# Patient Record
Sex: MALE | Race: WHITE | Employment: FULL TIME | URBAN - METROPOLITAN AREA
[De-identification: names, ages, dates, MRNs, and addresses within clinical notes are randomized per-mention and may not be internally consistent; named-entity substitution may affect disease eponyms.]

---

## 2017-05-31 ENCOUNTER — HOSPITAL ENCOUNTER (OUTPATIENT)
Dept: LAB | Age: 32
Discharge: HOME | End: 2017-05-31
Attending: INTERNAL MEDICINE | Admitting: INTERNAL MEDICINE

## 2017-05-31 LAB
ALBUMIN SERPL-MCNC: 4.4 GM/DL (ref 3.4–5)
ALP BLD-CCNC: 45 IU/L (ref 40–129)
ALT SERPL-CCNC: 9 U/L (ref 10–40)
AST SERPL-CCNC: 13 IU/L (ref 15–37)
BILIRUB SERPL-MCNC: 0.2 MG/DL (ref 0–1)
BILIRUBIN DIRECT: 0.2 MG/DL (ref 0–0.3)
BILIRUBIN, INDIRECT: 0 MG/DL (ref 0–0.7)
HEPATITIS B SURFACE ANTIGEN: NON REACTIVE
HEPATITIS C ANTIBODY: ABNORMAL
TOTAL PROTEIN: 6.8 GM/DL (ref 6.4–8.2)

## 2017-06-02 LAB — HIV SCREEN: NON REACTIVE

## 2018-06-05 ENCOUNTER — HOSPITAL ENCOUNTER (OUTPATIENT)
Dept: LAB | Age: 33
Discharge: HOME | End: 2018-06-05
Attending: INTERNAL MEDICINE | Admitting: INTERNAL MEDICINE

## 2018-06-05 LAB
ALBUMIN SERPL-MCNC: 5.1 GM/DL (ref 3.4–5)
ALP BLD-CCNC: 63 IU/L (ref 40–129)
ALT SERPL-CCNC: 11 U/L (ref 10–40)
AST SERPL-CCNC: 17 IU/L (ref 15–37)
BILIRUB SERPL-MCNC: 0.5 MG/DL (ref 0–1)
BILIRUBIN DIRECT: 0.2 MG/DL (ref 0–0.3)
BILIRUBIN, INDIRECT: 0.3 MG/DL (ref 0–0.7)
HEPATITIS B SURFACE ANTIGEN: NON REACTIVE
HEPATITIS C ANTIBODY: ABNORMAL
TOTAL PROTEIN: 8.2 GM/DL (ref 6.4–8.2)

## 2018-06-06 LAB — HIV SCREEN: NON REACTIVE

## 2018-10-27 ENCOUNTER — HOSPITAL ENCOUNTER (OUTPATIENT)
Age: 33
Setting detail: SPECIMEN
Discharge: HOME | End: 2018-10-27

## 2018-10-27 PROCEDURE — 86803 HEPATITIS C AB TEST: CPT

## 2018-10-27 PROCEDURE — 87340 HEPATITIS B SURFACE AG IA: CPT

## 2018-10-27 PROCEDURE — 87389 HIV-1 AG W/HIV-1&-2 AB AG IA: CPT

## 2018-10-27 PROCEDURE — 80076 HEPATIC FUNCTION PANEL: CPT

## 2018-10-29 LAB — HIV SCREEN: NON REACTIVE

## 2018-10-30 LAB
ALBUMIN SERPL-MCNC: 4.4 GM/DL (ref 3.4–5)
ALP BLD-CCNC: 63 IU/L (ref 40–129)
ALT SERPL-CCNC: 11 U/L (ref 10–40)
AST SERPL-CCNC: 12 IU/L (ref 15–37)
BILIRUB SERPL-MCNC: 0.3 MG/DL (ref 0–1)
BILIRUBIN DIRECT: 0.2 MG/DL (ref 0–0.3)
BILIRUBIN, INDIRECT: 0.1 MG/DL (ref 0–0.7)
HEPATITIS B SURFACE ANTIGEN: NON REACTIVE
HEPATITIS C ANTIBODY: ABNORMAL
TOTAL PROTEIN: 6.7 GM/DL (ref 6.4–8.2)

## 2019-01-30 ENCOUNTER — HOSPITAL ENCOUNTER (OUTPATIENT)
Dept: PSYCHIATRY | Age: 34
Setting detail: THERAPIES SERIES
Discharge: HOME | End: 2019-01-30
Payer: COMMERCIAL

## 2019-01-30 PROCEDURE — 90791 PSYCH DIAGNOSTIC EVALUATION: CPT

## 2019-01-30 PROCEDURE — 80305 DRUG TEST PRSMV DIR OPT OBS: CPT

## 2019-02-06 ENCOUNTER — HOSPITAL ENCOUNTER (OUTPATIENT)
Dept: PSYCHIATRY | Age: 34
Setting detail: THERAPIES SERIES
Discharge: HOME | End: 2019-02-06
Payer: COMMERCIAL

## 2019-02-06 PROCEDURE — 90834 PSYTX W PT 45 MINUTES: CPT

## 2019-02-13 ENCOUNTER — HOSPITAL ENCOUNTER (OUTPATIENT)
Dept: PSYCHIATRY | Age: 34
Setting detail: THERAPIES SERIES
Discharge: HOME | End: 2019-02-13
Payer: COMMERCIAL

## 2019-02-13 PROCEDURE — 90834 PSYTX W PT 45 MINUTES: CPT

## 2019-02-14 ENCOUNTER — HOSPITAL ENCOUNTER (OUTPATIENT)
Dept: PSYCHIATRY | Age: 34
Setting detail: THERAPIES SERIES
Discharge: HOME | End: 2019-02-14
Payer: COMMERCIAL

## 2019-02-14 PROCEDURE — 90853 GROUP PSYCHOTHERAPY: CPT

## 2019-02-15 ENCOUNTER — APPOINTMENT (OUTPATIENT)
Dept: PSYCHIATRY | Age: 34
End: 2019-02-15
Payer: COMMERCIAL

## 2019-02-20 ENCOUNTER — HOSPITAL ENCOUNTER (OUTPATIENT)
Dept: PSYCHIATRY | Age: 34
Setting detail: THERAPIES SERIES
Discharge: HOME | End: 2019-02-20
Payer: COMMERCIAL

## 2019-02-20 PROCEDURE — 90834 PSYTX W PT 45 MINUTES: CPT

## 2019-02-20 PROCEDURE — 80305 DRUG TEST PRSMV DIR OPT OBS: CPT

## 2019-02-21 ENCOUNTER — HOSPITAL ENCOUNTER (OUTPATIENT)
Dept: PSYCHIATRY | Age: 34
Setting detail: THERAPIES SERIES
Discharge: HOME | End: 2019-02-21
Payer: COMMERCIAL

## 2019-02-21 PROCEDURE — 90853 GROUP PSYCHOTHERAPY: CPT

## 2019-02-22 ENCOUNTER — APPOINTMENT (OUTPATIENT)
Dept: PSYCHIATRY | Age: 34
End: 2019-02-22
Payer: COMMERCIAL

## 2019-02-27 ENCOUNTER — HOSPITAL ENCOUNTER (OUTPATIENT)
Dept: PSYCHIATRY | Age: 34
Setting detail: THERAPIES SERIES
Discharge: HOME | End: 2019-02-27
Payer: COMMERCIAL

## 2019-02-27 PROCEDURE — 90834 PSYTX W PT 45 MINUTES: CPT

## 2019-02-27 PROCEDURE — 80305 DRUG TEST PRSMV DIR OPT OBS: CPT

## 2019-02-28 ENCOUNTER — HOSPITAL ENCOUNTER (OUTPATIENT)
Dept: PSYCHIATRY | Age: 34
Setting detail: THERAPIES SERIES
Discharge: HOME | End: 2019-02-28
Payer: COMMERCIAL

## 2019-02-28 PROCEDURE — 90853 GROUP PSYCHOTHERAPY: CPT

## 2019-03-01 ENCOUNTER — APPOINTMENT (OUTPATIENT)
Dept: PSYCHIATRY | Age: 34
End: 2019-03-01
Payer: COMMERCIAL

## 2019-03-06 ENCOUNTER — HOSPITAL ENCOUNTER (OUTPATIENT)
Dept: PSYCHIATRY | Age: 34
Setting detail: THERAPIES SERIES
Discharge: HOME | End: 2019-03-06
Payer: COMMERCIAL

## 2019-03-06 PROCEDURE — 90834 PSYTX W PT 45 MINUTES: CPT

## 2019-03-07 ENCOUNTER — HOSPITAL ENCOUNTER (OUTPATIENT)
Dept: PSYCHIATRY | Age: 34
Setting detail: THERAPIES SERIES
End: 2019-03-07
Payer: COMMERCIAL

## 2019-03-07 PROCEDURE — 90853 GROUP PSYCHOTHERAPY: CPT

## 2019-03-13 ENCOUNTER — HOSPITAL ENCOUNTER (OUTPATIENT)
Dept: PSYCHIATRY | Age: 34
Setting detail: THERAPIES SERIES
Discharge: HOME | End: 2019-03-13
Payer: COMMERCIAL

## 2019-03-13 PROCEDURE — 90834 PSYTX W PT 45 MINUTES: CPT

## 2019-03-14 ENCOUNTER — HOSPITAL ENCOUNTER (OUTPATIENT)
Dept: PSYCHIATRY | Age: 34
Setting detail: THERAPIES SERIES
Discharge: HOME | End: 2019-03-14
Payer: COMMERCIAL

## 2019-03-14 PROCEDURE — 90853 GROUP PSYCHOTHERAPY: CPT

## 2019-03-20 ENCOUNTER — APPOINTMENT (OUTPATIENT)
Dept: PSYCHIATRY | Age: 34
End: 2019-03-20
Payer: COMMERCIAL

## 2019-03-21 ENCOUNTER — APPOINTMENT (OUTPATIENT)
Dept: PSYCHIATRY | Age: 34
End: 2019-03-21
Payer: COMMERCIAL

## 2019-03-27 ENCOUNTER — APPOINTMENT (OUTPATIENT)
Dept: PSYCHIATRY | Age: 34
End: 2019-03-27
Payer: COMMERCIAL

## 2019-03-28 ENCOUNTER — APPOINTMENT (OUTPATIENT)
Dept: PSYCHIATRY | Age: 34
End: 2019-03-28
Payer: COMMERCIAL

## 2019-09-25 ENCOUNTER — HOSPITAL ENCOUNTER (EMERGENCY)
Age: 34
Discharge: LEFT BEFORE DISCHARGE | End: 2019-09-25
Payer: COMMERCIAL

## 2019-09-25 VITALS
HEIGHT: 71 IN | SYSTOLIC BLOOD PRESSURE: 138 MMHG | RESPIRATION RATE: 18 BRPM | TEMPERATURE: 103.2 F | HEART RATE: 100 BPM | WEIGHT: 200 LBS | DIASTOLIC BLOOD PRESSURE: 78 MMHG | OXYGEN SATURATION: 100 % | BODY MASS INDEX: 28 KG/M2

## 2019-09-25 PROCEDURE — 4500000002 HC ER NO CHARGE

## 2019-09-25 ASSESSMENT — PAIN DESCRIPTION - LOCATION: LOCATION: HEAD

## 2019-09-25 ASSESSMENT — PAIN SCALES - GENERAL: PAINLEVEL_OUTOF10: 10

## 2019-09-25 ASSESSMENT — PAIN DESCRIPTION - PAIN TYPE: TYPE: ACUTE PAIN

## 2019-11-14 ENCOUNTER — HOSPITAL ENCOUNTER (OUTPATIENT)
Dept: PSYCHIATRY | Age: 34
Setting detail: THERAPIES SERIES
Discharge: HOME | End: 2019-11-14
Payer: COMMERCIAL

## 2019-11-14 PROCEDURE — 80305 DRUG TEST PRSMV DIR OPT OBS: CPT

## 2019-11-14 PROCEDURE — 90791 PSYCH DIAGNOSTIC EVALUATION: CPT

## 2019-11-14 RX ORDER — NALTREXONE HYDROCHLORIDE 50 MG/1
50 TABLET, FILM COATED ORAL DAILY
COMMUNITY

## 2019-11-14 ASSESSMENT — LIFESTYLE VARIABLES: HISTORY_ALCOHOL_USE: NO

## 2020-05-04 ENCOUNTER — HOSPITAL ENCOUNTER (EMERGENCY)
Age: 35
Discharge: HOME | End: 2020-05-04
Attending: EMERGENCY MEDICINE
Payer: COMMERCIAL

## 2020-05-04 VITALS
RESPIRATION RATE: 16 BRPM | WEIGHT: 200 LBS | SYSTOLIC BLOOD PRESSURE: 123 MMHG | BODY MASS INDEX: 28 KG/M2 | OXYGEN SATURATION: 96 % | TEMPERATURE: 97.9 F | HEIGHT: 71 IN | DIASTOLIC BLOOD PRESSURE: 84 MMHG | HEART RATE: 74 BPM

## 2020-05-04 PROCEDURE — 99284 EMERGENCY DEPT VISIT MOD MDM: CPT

## 2020-05-05 NOTE — ED PROVIDER NOTES
Emergency Department Encounter    Patient: Zi Mckee  MRN: [id number]  : 1985  Date of Evaluation: 2020  ED Provider:  Gary Zarate    Triage Chief Complaint:   Mental Health Problem (brought in by [facility] for texts to GF. Pt and SPD deny pt sent texts)    [place]:  Zi Mckee is a 30 y.o. male that presents with SPD, not pink slipped. He reports had been having issues with his wife today, they have been fighting, he had not talked to her recently. Apparently wife called 911 to say that she was receiving text messages from him that he was suicidal.  He is currently staying at his parents home, when police arrived there parents had no concerns about him being suicidal or having any previous psychiatric problems. The police officers checked his phone and there were no texts or messages to say anything like that. While they were there looking at his phone and with him the wife called and stated that she was getting more messages as they spoke. He denies suicidal thoughts, has never had suicidal thoughts. Denies homicidal thoughts. Denies hallucinations. 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 He denies complaints of pain or other illness. He denies concerns at this time. SPD was not concerned that he would harm himself    ROS - see HPI, below listed is current ROS at time of my eval:  10 systems reviewed and negative except as above. History reviewed. No pertinent past medical history. History reviewed. No pertinent surgical history.   Family History   Problem Relation Age of Onset    High Blood Pressure Father     Substance Abuse Father     Substance Abuse Mother     Substance Abuse Sister     Alcohol Abuse Paternal Grandfather      Social History     Socioeconomic History    Marital status: Single     Spouse name: Not on file    Number of children: Not on file    Years of education: Not on file    Highest education level: Not on file   Occupational

## 2020-05-05 NOTE — ED TRIAGE NOTES
Pt presents to ED via SPD with c/o SI. SPD states that pt girlfriend stated that she received texts from pt stating he was going to harm himself. Pt denies suicidal ideation. Pt calm and cooperative in triage with no obvious signs of distress.

## 2020-05-05 NOTE — ED NOTES
Dr Lundberg President at bedside in [place] for assessment.      Alycia Hester RN  05/04/20 2216

## 2020-09-04 ENCOUNTER — HOSPITAL ENCOUNTER (OUTPATIENT)
Age: 35
Discharge: HOME | End: 2020-09-04
Payer: COMMERCIAL

## 2020-09-04 LAB
HAV IGM SER IA-ACNC: NON REACTIVE
HEPATITIS B CORE IGM ANTIBODY: NON REACTIVE
HEPATITIS B SURFACE ANTIGEN: NON REACTIVE
HEPATITIS C ANTIBODY: ABNORMAL
HIV SCREEN: NON REACTIVE

## 2020-09-04 PROCEDURE — 87389 HIV-1 AG W/HIV-1&-2 AB AG IA: CPT

## 2020-09-04 PROCEDURE — 80074 ACUTE HEPATITIS PANEL: CPT

## 2020-09-04 PROCEDURE — 36415 COLL VENOUS BLD VENIPUNCTURE: CPT

## 2020-09-04 PROCEDURE — 86708 HEPATITIS A ANTIBODY: CPT

## 2020-09-05 LAB — HAV AB SERPL IA-ACNC: NEGATIVE

## 2020-09-22 ENCOUNTER — APPOINTMENT (OUTPATIENT)
Dept: GENERAL RADIOLOGY | Age: 35
End: 2020-09-22
Payer: COMMERCIAL

## 2020-09-22 ENCOUNTER — HOSPITAL ENCOUNTER (EMERGENCY)
Age: 35
Discharge: HOME | End: 2020-09-22
Payer: COMMERCIAL

## 2020-09-22 VITALS
BODY MASS INDEX: 27.3 KG/M2 | SYSTOLIC BLOOD PRESSURE: 115 MMHG | RESPIRATION RATE: 16 BRPM | OXYGEN SATURATION: 96 % | DIASTOLIC BLOOD PRESSURE: 78 MMHG | TEMPERATURE: 98 F | WEIGHT: 195 LBS | HEIGHT: 71 IN | HEART RATE: 89 BPM

## 2020-09-22 PROCEDURE — 94761 N-INVAS EAR/PLS OXIMETRY MLT: CPT

## 2020-09-22 PROCEDURE — 99283 EMERGENCY DEPT VISIT LOW MDM: CPT

## 2020-09-22 PROCEDURE — 73564 X-RAY EXAM KNEE 4 OR MORE: CPT

## 2020-09-22 PROCEDURE — 73610 X-RAY EXAM OF ANKLE: CPT

## 2020-09-22 PROCEDURE — 6370000000 HC RX 637 (ALT 250 FOR IP): Performed by: PHYSICIAN ASSISTANT

## 2020-09-22 RX ORDER — IBUPROFEN 600 MG/1
600 TABLET ORAL ONCE
Status: COMPLETED | OUTPATIENT
Start: 2020-09-22 | End: 2020-09-22

## 2020-09-22 RX ORDER — IBUPROFEN 600 MG/1
600 TABLET ORAL EVERY 6 HOURS PRN
Qty: 30 TABLET | Refills: 1 | Status: SHIPPED | OUTPATIENT
Start: 2020-09-22

## 2020-09-22 RX ADMIN — IBUPROFEN 600 MG: 600 TABLET, FILM COATED ORAL at 12:02

## 2020-09-22 ASSESSMENT — PAIN DESCRIPTION - LOCATION: LOCATION: ANKLE;KNEE

## 2020-09-22 ASSESSMENT — PAIN SCALES - GENERAL
PAINLEVEL_OUTOF10: 10
PAINLEVEL_OUTOF10: 10

## 2020-09-22 ASSESSMENT — PAIN DESCRIPTION - PAIN TYPE: TYPE: ACUTE PAIN

## 2020-09-22 ASSESSMENT — PAIN DESCRIPTION - ORIENTATION: ORIENTATION: LEFT

## 2020-09-22 NOTE — ED PROVIDER NOTES
eMERGENCY dEPARTMENT eNCOUnter         [address] [facility]      PCP: No primary care provider on file. CHIEF COMPLAINT    Chief Complaint   Patient presents with    Leg Injury     left knee and ankle injury yesterday, fell off counter       HPI    Marti Peña II is a 30 y.o. male who presents with left knee and ankle pain. Onset was prior travel, yesterday afternoon. Context patient states that he was standing on a kitchen counter approximately 4 feet off the ground when he jumped off. States that he landed flat on his left foot which then twisted outward in his knee twisted. He then fell onto the anterior aspect of the left knee. No head injury or loss of consciousness. Was initially able to bear some weight on left lower leg yesterday but awoke this morning with increasing pain, 10/10 throughout the medial anterior left ankle and medial knee, worse with weightbearing and knee movement. No previous history of left lower leg fracture or surgery. Has not tried anything to count medications relief of symptoms. Is currently on Suboxone therapy. Pain in the knee worsens with direct palpation ambulation and knee extension movement. Denying any numbness tingling weakness radiating to the distal left foot. No ipsilateral thigh or hip pain. No other trauma or injury to the remaining extremities, chest, abdomen or head. REVIEW OF SYSTEMS    General: No fever or chills  Head: No headaches, trauma, dizziness  Skin: No lacerations or puncture wounds  Pulmonary: No SOB, wheezing  Cardiovascular: No chest pain, palpitations  Musculoskeletal: See HPI  All other review of systems are negative  See HPI and nursing notes for additional information     PAST MEDICAL & SURGICAL HISTORY    History reviewed. No pertinent past medical history. History reviewed. No pertinent surgical history.     CURRENT MEDICATIONS    Current Outpatient Rx   Medication Sig Dispense Pressure Father     Substance Abuse Father     Substance Abuse Mother     Substance Abuse Sister     Alcohol Abuse Paternal Grandfather          PHYSICAL EXAM    VITAL SIGNS: /78   Pulse 89   Temp 98 °F (36.7 °C) (Oral)   Resp 16   Ht 5' 11\" (1.803 m)   Wt 195 lb (88.5 kg)   SpO2 96%   BMI 27.20 kg/m²   Constitutional:  Well developed, well nourished. In mild acute pain    HENT:  Atraumatic, Normocephalic, sclera clear and non icteric, moist mucus membrane  Respiratory:  Nonlabored breathing. Musculoskeletal: The left lower leg demonstrates intact skin. No gross bony deformities abnormal legs shortening or rotation. Generalized tenderness palpation over the medial and peripatellar left knee as well as lateral and anterior left ankle without palpable bony or soft tissue defects. Achilles tendon and extensor mechanism knee is clinically intact. Increased pain on full extension of the knee as well as knee flexion greater than 90 degrees. No valgus or varus laxity but other manual manipulation of the knee is difficult to perform secondary to pain. No palpable ankle or knee joint effusion. 4/5 dorsi/plantar flexion against resistance with increased pain on ankle eversion greater than inversion. Pedal pulses 2+. Brisk capillary refill. No tenderness of the proximal fifth metatarsal or proximal foot dorsum concerning for Lisfranc instability. Calf is supple and nontender. Compartments are soft throughout the left lower leg. No foot drop. No swelling, discoloration, or tenderness to palpation of the proximal to distal lower leg bones, foot bones/toes    Vascular:  Posterior tibial and Dorsalis pedis pulse 2+, capillary refill intact. Integument:  No open wounds. Neurologic:  Awake alert, no slurred speech. No foot drop of the affected extremity. DTRs and distal sensation equal/intact.     RADIOLOGY      XR ANKLE LEFT (MIN 3 VIEWS) (Final result)   Result time 09/22/20 10:03:23   Final result by Alden Watkins MD (09/22/20 10:03:23)                 Impression:     1. No acute osseous abnormality involving the left ankle. Narrative:     EXAMINATION:   THREE XRAY VIEWS OF THE LEFT ANKLE     9/22/2020 9:25 am     COMPARISON:   None. HISTORY:   ORDERING SYSTEM PROVIDED HISTORY: trauma   TECHNOLOGIST PROVIDED HISTORY:   Reason for exam:->trauma   Reason for Exam: left ankle pain   Acuity: Acute   Type of Exam: Initial   Mechanism of Injury: jumped off countera yesterday and twisted leg     FINDINGS:   The bone mineralization is within normal limits.  The ankle mortise is   stable.  No acute fractures or dislocations are seen. Lorilee Nico is no soft   tissue swelling.                       XR KNEE LEFT (MIN 4 VIEWS) (Final result)   Result time 09/22/20 10:02:36   Procedure changed from XR KNEE LEFT (3 VIEWS)   Final result by Alden Watkins MD (09/22/20 10:02:36)                 Impression:     1. No acute abnormality involving the left knee. Narrative:     EXAMINATION:   FOUR XRAY VIEWS OF THE LEFT KNEE     9/22/2020 9:24 am     COMPARISON:   None. HISTORY:   ORDERING SYSTEM PROVIDED HISTORY: pain   TECHNOLOGIST PROVIDED HISTORY:   Reason for exam:->pain   Reason for Exam: left knee pain   Acuity: Acute   Type of Exam: Initial   Mechanism of Injury: stepped off counter yesterday and twisted leg     FINDINGS:   The bone mineralization is within normal limits.  The joint spaces appear   unremarkable.  No acute fractures or dislocations are seen. Lorilee Nico is no soft   tissue swelling. No bony erosions are seen.                      PROCEDURES   SPLINT PLACEMENT     A left knee immobilizer splint was applied by the emergency department technician. The splint is in good position. The patient's extremity is neurovascularly intact after the splint placement. ED COURSE & MEDICAL DECISION MAKING       Vital signs and nursing notes reviewed during ED course.   I have independently evaluated this patient . Supervising MD - Dr. Ankita Cruz - present in the Emergency Department, available for consultation, throughout entirety of  patient care. All pertinent Lab data and radiographic results reviewed with patient at bedside. The patient and/or the family were informed of the results of any tests/labs/imaging, the treatment plan, and time was allotted to answer questions. Differential diagnosis includes but not limited to fracture, dislocation, vascular injury, neurologic injury. Clinical  IMPRESSION    1. Acute pain of left knee    2. Acute left ankle pain        Patient presents with left knee and ankle pain after injury. Work-up was initiated triage. On exam, no gross bony deformities of the left lower leg. Tenderness producible with palpation movement of the left knee and ankle especially along the parapatellar region and lateral malleolus. No palpable bony or soft tissue defects. Knee and ankle joints are stable but knee manipulation and provocative exam limited secondary to pain. No valgus or varus laxity of the knee joint. Compartments are soft at the left lower leg with decreased dorsi and plantar flexion against resistance secondary to pain. No tenderness over the proximal fifth metatarsal.  Patient is given ice pack and ibuprofen in the ED. X-rays of the left knee and left ankle reveal no evidence of fracture or dislocation. Given mechanism of injury, discussed with patient likely acute knee and ankle sprain versus strain with component contusion. However to discuss cannot completely rule out internal derangement of the knee including meniscal injury. We discussed symptomatic management outpatient follow-up with orthopedics as she may benefit physical therapy versus advanced imaging clinic MRI to rule out soft tissue tear. Patient is comfortable and agreeable with plan. He is placed into the left knee Velcro immobilizer splint and provided crutches. Low clinical suspicion for ischemic limb, compartment syndrome, intra-articular joint infection/septic arthritis, DVT, osteomyelitis, arterial/neurologic injury, necrotizing fasciitis, or infected/rapidly expanding hematoma. Patient is discharged in stable condition. Educated on [facility] therapy. Provided anti-inflammatories and crutches, provided Ace wrap to the left ankle. Patient does not have PCP so I have given him/her doctor of the day contact information and encourage him/her to establish care and followup in the next 1-2 days. Patient is instructed to followup with orthopedics in 7-10 days, sooner with worsened symptoms. Return precautions back to the ED discussed for any new or worsening symptoms. In light of current events, I did utilize appropriate PPE (including N95 face mask, safety glasses, gloves, as recommended by the health facility/national standard best practice, during my bedside interactions with the patient. Patient was masked throughout ED course. Full droplet precaution as well as full PPE was followed throughout patient's ED course and evaluation. Diagnosis and plan discussed in detail with patient who understands and agrees. Patient agrees to return emergency department if symptoms worsen or any new symptoms develop. Comment: Please note this report has been produced using speech recognition software and may contain errors related to that system including errors in grammar, punctuation, and spelling, as well as words and phrases that may be inappropriate. If there are any questions or concerns please feel free to contact the dictating provider for clarification.         Tab Perez PA-C  09/22/20 2013

## 2020-10-08 ENCOUNTER — OFFICE VISIT (OUTPATIENT)
Dept: ORTHOPEDIC SURGERY | Age: 35
End: 2020-10-08
Payer: COMMERCIAL

## 2020-10-08 VITALS
HEIGHT: 71 IN | RESPIRATION RATE: 16 BRPM | WEIGHT: 195 LBS | HEART RATE: 89 BPM | OXYGEN SATURATION: 97 % | BODY MASS INDEX: 27.3 KG/M2

## 2020-10-08 PROCEDURE — 99202 OFFICE O/P NEW SF 15 MIN: CPT | Performed by: PHYSICIAN ASSISTANT

## 2020-10-08 PROCEDURE — G8419 CALC BMI OUT NRM PARAM NOF/U: HCPCS | Performed by: PHYSICIAN ASSISTANT

## 2020-10-08 PROCEDURE — G8484 FLU IMMUNIZE NO ADMIN: HCPCS | Performed by: PHYSICIAN ASSISTANT

## 2020-10-08 PROCEDURE — 4004F PT TOBACCO SCREEN RCVD TLK: CPT | Performed by: PHYSICIAN ASSISTANT

## 2020-10-08 PROCEDURE — G8427 DOCREV CUR MEDS BY ELIG CLIN: HCPCS | Performed by: PHYSICIAN ASSISTANT

## 2020-10-08 ASSESSMENT — ENCOUNTER SYMPTOMS
GASTROINTESTINAL NEGATIVE: 1
RESPIRATORY NEGATIVE: 1
EYES NEGATIVE: 1

## 2020-10-08 NOTE — PATIENT INSTRUCTIONS
MRI left knee to evaluate for medial meniscus tear  Once MRI complete, call office to go over results and get appropriate follow-up visit scheduled.   Ice, range of motion as tolerated

## 2020-10-08 NOTE — PROGRESS NOTES
I reviewed and agree with the portions of the HPI, review of systems, vital documentation and plan performed by my staff and have added/addended where appropriate. Review of Systems   Constitutional: Negative. HENT: Negative. Eyes: Negative. Respiratory: Negative. Cardiovascular: Negative. Gastrointestinal: Negative. Genitourinary: Negative. Musculoskeletal: Positive for arthralgias, gait problem, joint swelling and myalgias. Skin: Negative. Negative for rash and wound. Psychiatric/Behavioral: Negative. HPI:  Sylvie Oakley is a 30 y.o. male that presents the office today with complaints of left knee pain that has been intermittently worsening over the last 7 months since March. Patient states that about 12 years ago he had an injury to the knee and had an MRI of the knee which showed some meniscal fraying on the medial meniscus but no surgery was done. He states that he did have a steroid injection in that knee at that time and it did help but pain would always come back at some point and it usually resolved except now for the last 7 months it is not resolving it is getting worse. He admits to catching and locking in the knee as well as some swelling and difficulty straightening and bending the knee as well. He rates his pain at an 8/10, aching and sharp in nature. No past medical history on file. No past surgical history on file.     Family History   Problem Relation Age of Onset    High Blood Pressure Father     Substance Abuse Father     Substance Abuse Mother     Substance Abuse Sister     Alcohol Abuse Paternal Grandfather        Social History     Socioeconomic History    Marital status: Single     Spouse name: None    Number of children: None    Years of education: None    Highest education level: None   Occupational History    None   Social Needs    Financial resource strain: None    Food insecurity     Worry: None     Inability: None    Transportation needs     Medical: None     Non-medical: None   Tobacco Use    Smoking status: Never Smoker    Smokeless tobacco: Current User     Types: Snuff   Substance and Sexual Activity    Alcohol use: No    Drug use: No    Sexual activity: Not Currently   Lifestyle    Physical activity     Days per week: None     Minutes per session: None    Stress: None   Relationships    Social connections     Talks on phone: None     Gets together: None     Attends Congregation service: None     Active member of club or organization: None     Attends meetings of clubs or organizations: None     Relationship status: None    Intimate partner violence     Fear of current or ex partner: None     Emotionally abused: None     Physically abused: None     Forced sexual activity: None   Other Topics Concern    None   Social History Narrative    None       Current Outpatient Medications   Medication Sig Dispense Refill    ibuprofen (ADVIL;MOTRIN) 600 MG tablet Take 1 tablet by mouth every 6 hours as needed for Pain 30 tablet 1    naltrexone (DEPADE) 50 MG tablet Take 50 mg by mouth daily Client reports taking naltrexone for treatment for heroin use.  naproxen (NAPROSYN) 500 MG tablet Take 1 tablet by mouth 2 times daily (Patient not taking: Reported on 10/8/2020) 15 tablet 0    methylPREDNISolone (MEDROL, MARK,) 4 MG tablet Medrol Dose mark - Use as directed. #1 pack. (No refills) (Patient not taking: Reported on 10/8/2020) 1 kit 0    ondansetron (ZOFRAN) 4 MG tablet Take 1 tablet by mouth every 8 hours as needed for Nausea (Patient not taking: Reported on 10/8/2020) 10 tablet 0     No current facility-administered medications for this visit. Allergies   Allergen Reactions    Tramadol Other (See Comments)     Makes pale and space out.        Review of Systems:  See above      Physical Exam:   Pulse 89   Resp 16   Ht 5' 11\" (1.803 m)   Wt 195 lb (88.5 kg)   SpO2 97%   BMI 27.20 kg/m²        Gait is Antalgic. The patient can bear weight on the injured extremity. Gen/Psych:Examination reveals a pleasant individual in no acute distress. The patient is oriented to time, place and person. The patient's mood and affect are appropriate. Patient appears well nourished. Body habitus is mildly overweight     Lymph:  No lymphedema in bilateral lower extremities     Skin intact in bilateral lower extremities with no ulcerations, lesions, rash, erythema. Vascular: There are no varicosities in bilateral lower extremities, sensation intact to light touch over bilateral lower extremities. left leg/knee exam:  Leg alignment:     neutral  Quadriceps/hamstring atrophy:   none  Knee effusion:    mild    Knee erythema:   no  ROM:     3-110°  Lachman:    no  Pivot Shift:    no  Posterior drawer:   no    Increased ER at 30 deg's:  no  Varus laxity at 0 and 30 deg's: no  Valguslaxity at 0 and 30 deg's: no  Recurvatum:    no  Tenderness at:   Medial joint line and mild lateral joint line tenderness. Knee strength is 5/5 flexion and extension  There is + L2-S1 motor and sensory function. Outside record review: ER records    X-rays of the left knee were reviewed which showed no fractures, no dislocations, no significant degenerative change and a small knee joint effusion. Impression: Left knee effusion (possible medial meniscus tear)      Plan:    Patient Instructions   MRI left knee to evaluate for medial meniscus tear  Once MRI complete, call office to go over results and get appropriate follow-up visit scheduled.   Ice, range of motion as tolerated

## 2020-10-13 ENCOUNTER — HOSPITAL ENCOUNTER (EMERGENCY)
Age: 35
Discharge: HOME | End: 2020-10-13
Attending: EMERGENCY MEDICINE
Payer: COMMERCIAL

## 2020-10-13 ENCOUNTER — APPOINTMENT (OUTPATIENT)
Dept: GENERAL RADIOLOGY | Age: 35
End: 2020-10-13
Payer: COMMERCIAL

## 2020-10-13 VITALS
HEIGHT: 71 IN | OXYGEN SATURATION: 99 % | RESPIRATION RATE: 18 BRPM | TEMPERATURE: 99.3 F | WEIGHT: 197 LBS | SYSTOLIC BLOOD PRESSURE: 139 MMHG | BODY MASS INDEX: 27.58 KG/M2 | DIASTOLIC BLOOD PRESSURE: 85 MMHG | HEART RATE: 92 BPM

## 2020-10-13 LAB
ALBUMIN SERPL-MCNC: 4.5 GM/DL (ref 3.4–5)
ALP BLD-CCNC: 72 IU/L (ref 40–129)
ALT SERPL-CCNC: 201 U/L (ref 10–40)
ANION GAP SERPL CALCULATED.3IONS-SCNC: 12 MMOL/L (ref 4–16)
AST SERPL-CCNC: 192 IU/L (ref 15–37)
BASOPHILS ABSOLUTE: 0.1 K/CU MM
BASOPHILS RELATIVE PERCENT: 0.7 % (ref 0–1)
BILIRUB SERPL-MCNC: 0.6 MG/DL (ref 0–1)
BUN BLDV-MCNC: 12 MG/DL (ref 6–23)
CALCIUM SERPL-MCNC: 9.2 MG/DL (ref 8.3–10.6)
CHLORIDE BLD-SCNC: 91 MMOL/L (ref 99–110)
CO2: 31 MMOL/L (ref 21–32)
CREAT SERPL-MCNC: 0.9 MG/DL (ref 0.9–1.3)
DIFFERENTIAL TYPE: ABNORMAL
EOSINOPHILS ABSOLUTE: 0.2 K/CU MM
EOSINOPHILS RELATIVE PERCENT: 1.5 % (ref 0–3)
GFR AFRICAN AMERICAN: >60 ML/MIN/1.73M2
GFR NON-AFRICAN AMERICAN: >60 ML/MIN/1.73M2
GLUCOSE BLD-MCNC: 122 MG/DL (ref 70–99)
HAV IGM SER IA-ACNC: NON REACTIVE
HCT VFR BLD CALC: 49 % (ref 42–52)
HEMOGLOBIN: 15.4 GM/DL (ref 13.5–18)
HEPATITIS B CORE IGM ANTIBODY: NON REACTIVE
HEPATITIS B SURFACE ANTIGEN: NON REACTIVE
HEPATITIS C ANTIBODY: ABNORMAL
IMMATURE NEUTROPHIL %: 0.4 % (ref 0–0.43)
LYMPHOCYTES ABSOLUTE: 0.2 K/CU MM
LYMPHOCYTES RELATIVE PERCENT: 2.4 % (ref 24–44)
MCH RBC QN AUTO: 28.5 PG (ref 27–31)
MCHC RBC AUTO-ENTMCNC: 31.4 % (ref 32–36)
MCV RBC AUTO: 90.6 FL (ref 78–100)
MONOCYTES ABSOLUTE: 0.6 K/CU MM
MONOCYTES RELATIVE PERCENT: 5.7 % (ref 0–4)
NUCLEATED RBC %: 0 %
PDW BLD-RTO: 13 % (ref 11.7–14.9)
PLATELET # BLD: 295 K/CU MM (ref 140–440)
PMV BLD AUTO: 9.3 FL (ref 7.5–11.1)
POTASSIUM SERPL-SCNC: 4.6 MMOL/L (ref 3.5–5.1)
RAPID INFLUENZA  B AGN: NEGATIVE
RAPID INFLUENZA A AGN: NEGATIVE
RBC # BLD: 5.41 M/CU MM (ref 4.6–6.2)
SEGMENTED NEUTROPHILS ABSOLUTE COUNT: 8.8 K/CU MM
SEGMENTED NEUTROPHILS RELATIVE PERCENT: 89.3 % (ref 36–66)
SODIUM BLD-SCNC: 134 MMOL/L (ref 135–145)
TOTAL IMMATURE NEUTOROPHIL: 0.04 K/CU MM
TOTAL NUCLEATED RBC: 0 K/CU MM
TOTAL PROTEIN: 7.3 GM/DL (ref 6.4–8.2)
WBC # BLD: 9.8 K/CU MM (ref 4–10.5)

## 2020-10-13 PROCEDURE — 85025 COMPLETE CBC W/AUTO DIFF WBC: CPT

## 2020-10-13 PROCEDURE — 87081 CULTURE SCREEN ONLY: CPT

## 2020-10-13 PROCEDURE — 80074 ACUTE HEPATITIS PANEL: CPT

## 2020-10-13 PROCEDURE — 6360000002 HC RX W HCPCS: Performed by: EMERGENCY MEDICINE

## 2020-10-13 PROCEDURE — U0002 COVID-19 LAB TEST NON-CDC: HCPCS

## 2020-10-13 PROCEDURE — 87430 STREP A AG IA: CPT

## 2020-10-13 PROCEDURE — 6370000000 HC RX 637 (ALT 250 FOR IP): Performed by: EMERGENCY MEDICINE

## 2020-10-13 PROCEDURE — 93005 ELECTROCARDIOGRAM TRACING: CPT | Performed by: EMERGENCY MEDICINE

## 2020-10-13 PROCEDURE — 96372 THER/PROPH/DIAG INJ SC/IM: CPT

## 2020-10-13 PROCEDURE — 71045 X-RAY EXAM CHEST 1 VIEW: CPT

## 2020-10-13 PROCEDURE — 87804 INFLUENZA ASSAY W/OPTIC: CPT

## 2020-10-13 PROCEDURE — 99284 EMERGENCY DEPT VISIT MOD MDM: CPT

## 2020-10-13 PROCEDURE — 36415 COLL VENOUS BLD VENIPUNCTURE: CPT

## 2020-10-13 PROCEDURE — 80053 COMPREHEN METABOLIC PANEL: CPT

## 2020-10-13 RX ORDER — GUAIFENESIN AND DEXTROMETHORPHAN HYDROBROMIDE 600; 30 MG/1; MG/1
1 TABLET, EXTENDED RELEASE ORAL 2 TIMES DAILY
Qty: 28 TABLET | Refills: 0 | Status: SHIPPED | OUTPATIENT
Start: 2020-10-13

## 2020-10-13 RX ORDER — ACETAMINOPHEN 500 MG
1000 TABLET ORAL ONCE
Status: COMPLETED | OUTPATIENT
Start: 2020-10-13 | End: 2020-10-13

## 2020-10-13 RX ORDER — ACETAMINOPHEN 500 MG
1000 TABLET ORAL 3 TIMES DAILY
Qty: 60 TABLET | Refills: 0 | Status: SHIPPED | OUTPATIENT
Start: 2020-10-13 | End: 2020-10-23

## 2020-10-13 RX ORDER — KETOROLAC TROMETHAMINE 30 MG/ML
30 INJECTION, SOLUTION INTRAMUSCULAR; INTRAVENOUS ONCE
Status: COMPLETED | OUTPATIENT
Start: 2020-10-13 | End: 2020-10-13

## 2020-10-13 RX ORDER — ALBUTEROL SULFATE 90 UG/1
2 AEROSOL, METERED RESPIRATORY (INHALATION) EVERY 6 HOURS PRN
Qty: 1 INHALER | Refills: 3 | Status: SHIPPED | OUTPATIENT
Start: 2020-10-13

## 2020-10-13 RX ADMIN — ACETAMINOPHEN 1000 MG: 500 TABLET ORAL at 19:48

## 2020-10-13 RX ADMIN — KETOROLAC TROMETHAMINE 30 MG: 30 INJECTION, SOLUTION INTRAMUSCULAR; INTRAVENOUS at 19:48

## 2020-10-13 RX ADMIN — GUAIFENESIN AND DEXTROMETHORPHAN HYDROBROMIDE 1 TABLET: 600; 30 TABLET, EXTENDED RELEASE ORAL at 20:32

## 2020-10-13 ASSESSMENT — PAIN SCALES - GENERAL
PAINLEVEL_OUTOF10: 8
PAINLEVEL_OUTOF10: 8

## 2020-10-13 NOTE — ED PROVIDER NOTES
Triage Chief Complaint:   Fever (102 at home, ibuprophen at 1450); Cough; and Pharyngitis    Hoh:  Michael Garcia is a 30 y.o. male that presents with fever, cough and sore throat. Patient was in baseline state of health until yesterday when the above started. Patient reports fever up to 102 degrees at home. Some associated nonproductive cough and sore throat. No known sick contacts. No recent hospitalizations. Patient denies any smoking. No known cardiopulmonary disease. Patient has been able to tolerate a diet. Normal bowel bladder habits. No chest pain or shortness of breath. ROS:  General:  + fevers, no chills  ENT: + sore throat, no runny nose  Cardiovascular:  No chest pain, no palpitations  Respiratory:  No shortness of breath, + cough  Gastrointestinal:  No pain, no nausea, no vomiting, no diarrhea  : No pain with urinating, no increased frequency urinating  Neurologic:  No numbness, no weakness  Extremities:  No edema, no pain  Skin:  No rash  Psych: No axienty    History reviewed. No pertinent past medical history. History reviewed. No pertinent surgical history.   Family History   Problem Relation Age of Onset    High Blood Pressure Father     Substance Abuse Father     Substance Abuse Mother     Substance Abuse Sister     Alcohol Abuse Paternal Grandfather      Social History     Socioeconomic History    Marital status: Single     Spouse name: Not on file    Number of children: Not on file    Years of education: Not on file    Highest education level: Not on file   Occupational History    Not on file   Social Needs    Financial resource strain: Not on file    Food insecurity     Worry: Not on file     Inability: Not on file   [employer] needs     Medical: Not on file     Non-medical: Not on file   Tobacco Use    Smoking status: Never Smoker    Smokeless tobacco: Current User     Types: Snuff   Substance and Sexual Activity    Alcohol use: No    Drug use: No    Sexual activity: Not Currently   Lifestyle    Physical activity     Days per week: Not on file     Minutes per session: Not on file    Stress: Not on file   Relationships    Social connections     Talks on phone: Not on file     Gets together: Not on file     Attends [employer] service: Not on file     Active member of club or organization: Not on file     Attends meetings of clubs or organizations: Not on file     Relationship status: Not on file    Intimate partner violence     Fear of current or ex partner: Not on file     Emotionally abused: Not on file     Physically abused: Not on file     Forced sexual activity: Not on file   Other Topics Concern    Not on file   Social History Narrative    Not on file     No current facility-administered medications for this encounter. Current Outpatient Medications   Medication Sig Dispense Refill    acetaminophen (TYLENOL) 500 MG tablet Take 2 tablets by mouth 3 times daily for 10 days 60 tablet 0    Dextromethorphan-guaiFENesin (MUCINEX DM)  MG TB12 Take 1 tablet by mouth 2 times daily 28 tablet 0    albuterol sulfate HFA (PROAIR HFA) 108 (90 Base) MCG/ACT inhaler Inhale 2 puffs into the lungs every 6 hours as needed for Wheezing 1 Inhaler 3    ibuprofen (ADVIL;MOTRIN) 600 MG tablet Take 1 tablet by mouth every 6 hours as needed for Pain 30 tablet 1    naltrexone (DEPADE) 50 MG tablet Take 50 mg by mouth daily Client reports taking naltrexone for treatment for heroin use.  naproxen (NAPROSYN) 500 MG tablet Take 1 tablet by mouth 2 times daily (Patient not taking: Reported on 10/8/2020) 15 tablet 0    methylPREDNISolone (MEDROL, MARK,) 4 MG tablet Medrol Dose mark - Use as directed. #1 pack.   (No refills) (Patient not taking: Reported on 10/8/2020) 1 kit 0    ondansetron (ZOFRAN) 4 MG tablet Take 1 tablet by mouth every 8 hours as needed for Nausea (Patient not taking: Reported on 10/8/2020) 10 tablet 0     No Known Allergies    Nursing Notes Reviewed    Physical Exam:  ED Triage Vitals [10/13/20 1527]   Enc Vitals Group      /71      Pulse 139      Resp 16      Temp 99.3 °F (37.4 °C)      Temp Source Oral      SpO2 96 %      Weight 197 lb (89.4 kg)      Height 5' 11\" (1.803 m)      Head Circumference       Peak Flow       Pain Score       Pain Loc       Pain Edu? Excl. in [address]? My pulse ox interpretation is - normal    General appearance:  No acute distress. Sitting comfortably in bed. Appears overall nontoxic. Skin:  Warm. Dry. No diaphoresis. No rash to exposed skin. Eye:  Extraocular movements intact. Ears, nose, mouth and throat:  Oral mucosa moist   Extremity:  No swelling. Normal ROM     Heart: Slightly tachycardic but regular, normal S1 & S2, no extra heart sounds. Abdomen:  Soft. Nontender. Nondistended. No rebound or guarding. Respiratory:  Lungs clear to auscultation bilaterally. Respirations nonlabored. Speaking clearly in full sentences. No respiratory distress. Neurological:  Alert and oriented times 3. No focal neuro deficits.                 I have reviewed and interpreted all of the currently available lab results from this visit (if applicable):  Results for orders placed or performed during the hospital encounter of 10/13/20   Strep Screen Group A Throat    Specimen: Throat   Result Value Ref Range    Specimen THROAT     Special Requests NONE     Strep A Direct Screen NEGATIVE     Culture       Final Report  Normal oral bharath, No Beta Strep isolated     Rapid Flu Swab    Specimen: Nasopharyngeal   Result Value Ref Range    Rapid Influenza A Ag NEGATIVE NEGATIVE    Rapid Influenza B Ag NEGATIVE NEGATIVE   CBC Auto Differential   Result Value Ref Range    WBC 9.8 4.0 - 10.5 K/CU MM    RBC 5.41 4.6 - 6.2 M/CU MM    Hemoglobin 15.4 13.5 - 18.0 GM/DL    Hematocrit 49.0 42 - 52 %    MCV 90.6 78 - 100 FL    MCH 28.5 27 - 31 PG    MCHC 31.4 (L) 32.0 - 36.0 %    RDW 13.0 11.7 - 14.9 %    Platelets 037 549 - involving the left ankle. Xr Chest Portable    Result Date: 10/13/2020  EXAMINATION: ONE XRAY VIEW OF THE CHEST 10/13/2020 4:23 pm COMPARISON: None. HISTORY: ORDERING SYSTEM PROVIDED HISTORY: cough, fever Reason for Exam: fever   cough   pharyngitis Acuity: Acute Type of Exam: Initial FINDINGS: The cardiomediastinal and hilar silhouettes appear unremarkable. The lungs appear clear. No pleural effusion evident. No pneumothorax is seen. No acute osseous abnormality is identified. No radiographic evidence of acute cardiopulmonary disease. MDM:  Pt presents as above. Emergent conditions considered. Presentation prompted initial labs, swabs and chest x-ray. CBC and CMP without clinically significant derangement other than mild hyperglycemia without acidosis and a moderate transaminitis. Strep swab is negative and flu swab is negative. COVID-19 swab is sent. Given patient's transaminitis hepatitis panel is sent and is pending. Chest x-ray is negative for acute cardiopulmonary process. Patient is initially tachycardic but the tachycardia resolves with symptomatic treatment of Toradol and Tylenol. Additionally, Mucinex DM is given for his cough. I suspicion for novel COVID-19 versus other viral process. Given patient's overall well appearance I do believe he is appropriate for the outpatient follow-up. A list of primary care providers is given to the patient to ensure close follow-up. I did discuss pending hepatitis panel and the need for follow-up for this. Prescription for scheduled Tylenol, Mucinex DM and albuterol inhaler for home-going. I discussed specific signs and symptoms and when to return to the emergency department as well as need for close outpatient follow-up. Questions sought and answered with the patient. They voice understanding and agree with plan. Clinical Impression:  1. Suspected COVID-19 virus infection    2. Viral illness    3.  Transaminitis

## 2020-10-14 ENCOUNTER — CARE COORDINATION (OUTPATIENT)
Dept: CARE COORDINATION | Age: 35
End: 2020-10-14

## 2020-10-14 PROCEDURE — 93010 ELECTROCARDIOGRAM REPORT: CPT | Performed by: INTERNAL MEDICINE

## 2020-10-14 NOTE — ED NOTES
Notified patient's transportation     Abram Conti, [address] [facility]  10/13/20 2034

## 2020-10-15 LAB
CULTURE: NORMAL
Lab: NORMAL
SARS-COV-2: NOT DETECTED
SOURCE: NORMAL
SPECIMEN: NORMAL
STREP A DIRECT SCREEN: NEGATIVE

## 2020-10-16 ENCOUNTER — CARE COORDINATION (OUTPATIENT)
Dept: CARE COORDINATION | Age: 35
End: 2020-10-16

## 2020-10-16 NOTE — CARE COORDINATION
Call to pt for ed f/u. Per recording person is not available. Second attempt, no further outreach planned.

## 2020-10-20 LAB
EKG ATRIAL RATE: 138 BPM
EKG DIAGNOSIS: NORMAL
EKG P AXIS: 66 DEGREES
EKG P-R INTERVAL: 128 MS
EKG Q-T INTERVAL: 260 MS
EKG QRS DURATION: 64 MS
EKG QTC CALCULATION (BAZETT): 393 MS
EKG R AXIS: 47 DEGREES
EKG T AXIS: 45 DEGREES
EKG VENTRICULAR RATE: 138 BPM

## 2021-04-09 ENCOUNTER — HOSPITAL ENCOUNTER (OUTPATIENT)
Age: 36
Setting detail: SPECIMEN
Discharge: HOME | End: 2021-04-09
Payer: COMMERCIAL

## 2021-04-09 LAB
ALBUMIN SERPL-MCNC: 4.6 GM/DL (ref 3.4–5)
ALP BLD-CCNC: 47 IU/L (ref 40–129)
ALT SERPL-CCNC: 12 U/L (ref 10–40)
AST SERPL-CCNC: 13 IU/L (ref 15–37)
BILIRUB SERPL-MCNC: 0.4 MG/DL (ref 0–1)
BILIRUBIN DIRECT: 0.2 MG/DL (ref 0–0.3)
BILIRUBIN, INDIRECT: 0.2 MG/DL (ref 0–0.7)
HAV IGM SER IA-ACNC: NON REACTIVE
HEPATITIS B SURFACE ANTIGEN: NON REACTIVE
HEPATITIS C ANTIBODY: ABNORMAL
TOTAL PROTEIN: 7.2 GM/DL (ref 6.4–8.2)

## 2021-04-09 PROCEDURE — 86709 HEPATITIS A IGM ANTIBODY: CPT

## 2021-04-09 PROCEDURE — 87340 HEPATITIS B SURFACE AG IA: CPT

## 2021-04-09 PROCEDURE — 80076 HEPATIC FUNCTION PANEL: CPT

## 2021-04-09 PROCEDURE — 86803 HEPATITIS C AB TEST: CPT

## 2021-04-09 PROCEDURE — 87389 HIV-1 AG W/HIV-1&-2 AB AG IA: CPT

## 2021-04-12 LAB — HIV SCREEN: NON REACTIVE

## 2024-06-14 ENCOUNTER — HOSPITAL ENCOUNTER (EMERGENCY)
Age: 39
Discharge: HOME | End: 2024-06-14
Payer: MEDICAID

## 2024-06-14 VITALS
OXYGEN SATURATION: 97 % | TEMPERATURE: 98.1 F | DIASTOLIC BLOOD PRESSURE: 79 MMHG | BODY MASS INDEX: 34.43 KG/M2 | WEIGHT: 240.52 LBS | HEART RATE: 72 BPM | RESPIRATION RATE: 16 BRPM | HEIGHT: 70 IN | SYSTOLIC BLOOD PRESSURE: 131 MMHG

## 2024-06-14 DIAGNOSIS — Z75.8 DOES NOT HAVE PRIMARY CARE PROVIDER: ICD-10-CM

## 2024-06-14 DIAGNOSIS — R10.13 ABDOMINAL PAIN, EPIGASTRIC: Primary | ICD-10-CM

## 2024-06-14 DIAGNOSIS — E86.0 DEHYDRATION: ICD-10-CM

## 2024-06-14 LAB
ALBUMIN SERPL-MCNC: 5 G/DL (ref 3.4–5)
ALP SERPL-CCNC: 49 U/L (ref 40–129)
ALT SERPL-CCNC: 46 U/L (ref 10–40)
ANION GAP SERPL CALCULATED.3IONS-SCNC: 18 MMOL/L (ref 3–16)
AST SERPL-CCNC: 36 U/L (ref 15–37)
BACTERIA URNS QL MICRO: ABNORMAL /HPF
BASOPHILS # BLD: 0 K/UL (ref 0–0.2)
BASOPHILS NFR BLD: 0.6 %
BILIRUB DIRECT SERPL-MCNC: <0.2 MG/DL (ref 0–0.3)
BILIRUB INDIRECT SERPL-MCNC: ABNORMAL MG/DL (ref 0–1)
BILIRUB SERPL-MCNC: 0.9 MG/DL (ref 0–1)
BILIRUB UR QL STRIP.AUTO: ABNORMAL
BUN SERPL-MCNC: 11 MG/DL (ref 7–20)
CALCIUM SERPL-MCNC: 9.6 MG/DL (ref 8.3–10.6)
CHLORIDE SERPL-SCNC: 102 MMOL/L (ref 99–110)
CLARITY UR: CLEAR
CO2 SERPL-SCNC: 21 MMOL/L (ref 21–32)
COLOR UR: ABNORMAL
CREAT SERPL-MCNC: 1.2 MG/DL (ref 0.9–1.3)
DEPRECATED RDW RBC AUTO: 13.4 % (ref 12.4–15.4)
EOSINOPHIL # BLD: 0 K/UL (ref 0–0.6)
EOSINOPHIL NFR BLD: 0.7 %
EPI CELLS #/AREA URNS HPF: ABNORMAL /HPF (ref 0–5)
GFR SERPLBLD CREATININE-BSD FMLA CKD-EPI: 80 ML/MIN/{1.73_M2}
GLUCOSE SERPL-MCNC: 95 MG/DL (ref 70–99)
GLUCOSE UR STRIP.AUTO-MCNC: NEGATIVE MG/DL
HCT VFR BLD AUTO: 45.7 % (ref 40.5–52.5)
HGB BLD-MCNC: 15.8 G/DL (ref 13.5–17.5)
HGB UR QL STRIP.AUTO: NEGATIVE
LEUKOCYTE ESTERASE UR QL STRIP.AUTO: NEGATIVE
LIPASE SERPL-CCNC: 33 U/L (ref 13–60)
LYMPHOCYTES # BLD: 1.8 K/UL (ref 1–5.1)
LYMPHOCYTES NFR BLD: 23.7 %
MCH RBC QN AUTO: 30.4 PG (ref 26–34)
MCHC RBC AUTO-ENTMCNC: 34.7 G/DL (ref 31–36)
MCV RBC AUTO: 87.7 FL (ref 80–100)
MONOCYTES # BLD: 0.8 K/UL (ref 0–1.3)
MONOCYTES NFR BLD: 11 %
MUCOUS THREADS #/AREA URNS LPF: ABNORMAL /LPF
NEUTROPHILS # BLD: 4.8 K/UL (ref 1.7–7.7)
NITRITE UR QL STRIP.AUTO: NEGATIVE
PH UR STRIP.AUTO: 6 [PH] (ref 5–8)
PLATELET # BLD AUTO: 276 K/UL (ref 135–450)
PMV BLD AUTO: 8.4 FL (ref 5–10.5)
POTASSIUM SERPL-SCNC: 4.1 MMOL/L (ref 3.5–5.1)
PROT SERPL-MCNC: 7.8 G/DL (ref 6.4–8.2)
PROT UR STRIP.AUTO-MCNC: ABNORMAL MG/DL
RBC # BLD AUTO: 5.21 M/UL (ref 4.2–5.9)
RBC #/AREA URNS HPF: ABNORMAL /HPF (ref 0–4)
SODIUM SERPL-SCNC: 141 MMOL/L (ref 136–145)
SP GR UR STRIP.AUTO: >=1.03 (ref 1–1.03)
TROPONIN, HIGH SENSITIVITY: 8 NG/L (ref 0–22)
UA COMPLETE W REFLEX CULTURE PNL UR: ABNORMAL
UA DIPSTICK W REFLEX MICRO PNL UR: YES
URN SPEC COLLECT METH UR: ABNORMAL
UROBILINOGEN UR STRIP-ACNC: 1 E.U./DL
WBC # BLD AUTO: 7.5 K/UL (ref 4–11)
WBC #/AREA URNS HPF: ABNORMAL /HPF (ref 0–5)

## 2024-06-14 PROCEDURE — 36415 COLL VENOUS BLD VENIPUNCTURE: CPT

## 2024-06-14 PROCEDURE — 99284 EMERGENCY DEPT VISIT MOD MDM: CPT

## 2024-06-14 PROCEDURE — 80048 BASIC METABOLIC PNL TOTAL CA: CPT

## 2024-06-14 PROCEDURE — 83690 ASSAY OF LIPASE: CPT

## 2024-06-14 PROCEDURE — 80076 HEPATIC FUNCTION PANEL: CPT

## 2024-06-14 PROCEDURE — 2580000003 HC RX 258

## 2024-06-14 PROCEDURE — 96360 HYDRATION IV INFUSION INIT: CPT

## 2024-06-14 PROCEDURE — 6370000000 HC RX 637 (ALT 250 FOR IP)

## 2024-06-14 PROCEDURE — 93005 ELECTROCARDIOGRAM TRACING: CPT

## 2024-06-14 PROCEDURE — 85025 COMPLETE CBC W/AUTO DIFF WBC: CPT

## 2024-06-14 PROCEDURE — 81001 URINALYSIS AUTO W/SCOPE: CPT

## 2024-06-14 PROCEDURE — 84484 ASSAY OF TROPONIN QUANT: CPT

## 2024-06-14 RX ORDER — FAMOTIDINE 20 MG/1
20 TABLET, FILM COATED ORAL 2 TIMES DAILY
Qty: 60 TABLET | Refills: 0 | Status: SHIPPED | OUTPATIENT
Start: 2024-06-14

## 2024-06-14 RX ORDER — FAMOTIDINE 20 MG/1
20 TABLET, FILM COATED ORAL ONCE
Status: COMPLETED | OUTPATIENT
Start: 2024-06-14 | End: 2024-06-14

## 2024-06-14 RX ORDER — 0.9 % SODIUM CHLORIDE 0.9 %
1000 INTRAVENOUS SOLUTION INTRAVENOUS ONCE
Status: COMPLETED | OUTPATIENT
Start: 2024-06-14 | End: 2024-06-14

## 2024-06-14 RX ORDER — SODIUM CHLORIDE, SODIUM LACTATE, POTASSIUM CHLORIDE, AND CALCIUM CHLORIDE .6; .31; .03; .02 G/100ML; G/100ML; G/100ML; G/100ML
1000 INJECTION, SOLUTION INTRAVENOUS ONCE
Status: DISCONTINUED | OUTPATIENT
Start: 2024-06-14 | End: 2024-06-14

## 2024-06-14 RX ADMIN — FAMOTIDINE 20 MG: 20 TABLET, FILM COATED ORAL at 15:22

## 2024-06-14 RX ADMIN — SODIUM CHLORIDE 1000 ML: 9 INJECTION, SOLUTION INTRAVENOUS at 15:27

## 2024-06-14 RX ADMIN — Medication: at 15:22

## 2024-06-14 ASSESSMENT — PAIN SCALES - GENERAL
PAINLEVEL_OUTOF10: 7
PAINLEVEL_OUTOF10: 0
PAINLEVEL_OUTOF10: 0

## 2024-06-14 ASSESSMENT — LIFESTYLE VARIABLES
HOW OFTEN DO YOU HAVE A DRINK CONTAINING ALCOHOL: NEVER
HOW MANY STANDARD DRINKS CONTAINING ALCOHOL DO YOU HAVE ON A TYPICAL DAY: PATIENT DOES NOT DRINK
HOW MANY STANDARD DRINKS CONTAINING ALCOHOL DO YOU HAVE ON A TYPICAL DAY: 1 OR 2
HOW OFTEN DO YOU HAVE A DRINK CONTAINING ALCOHOL: MONTHLY OR LESS

## 2024-06-14 ASSESSMENT — PAIN DESCRIPTION - FREQUENCY: FREQUENCY: INTERMITTENT

## 2024-06-14 ASSESSMENT — PAIN DESCRIPTION - PAIN TYPE: TYPE: CHRONIC PAIN

## 2024-06-14 ASSESSMENT — PAIN - FUNCTIONAL ASSESSMENT: PAIN_FUNCTIONAL_ASSESSMENT: PREVENTS OR INTERFERES SOME ACTIVE ACTIVITIES AND ADLS

## 2024-06-14 ASSESSMENT — PAIN DESCRIPTION - ORIENTATION: ORIENTATION: UPPER

## 2024-06-14 ASSESSMENT — PAIN DESCRIPTION - LOCATION
LOCATION: ABDOMEN
LOCATION: ABDOMEN

## 2024-06-14 ASSESSMENT — PAIN DESCRIPTION - ONSET: ONSET: ON-GOING

## 2024-06-14 NOTE — ED PROVIDER NOTES
EKG interpretation    Normal sinus rhythm ventricular rate 63 IN intervals 146 normal QRS normal EKG     Brandyn Stearns MD  06/14/24 [id number]    
primary care provider    3. Dehydration          DISPOSITION/PLAN     DISPOSITION Decision To Discharge 06/14/2024 04:25:52 PM      PATIENT REFERRED TO:  [facility]  [phone]          DISCHARGE MEDICATIONS:  Discharge Medication List as of 6/14/2024  4:37 PM        START taking these medications    Details   famotidine (PEPCID) 20 MG tablet Take 1 tablet by mouth 2 times daily, Disp-60 tablet, R-0Normal             DISCONTINUED MEDICATIONS:  Discharge Medication List as of 6/14/2024  4:37 PM        STOP taking these medications       Dextromethorphan-guaiFENesin (MUCINEX DM)  MG TB12 Comments:   Reason for Stopping:         albuterol sulfate HFA (PROAIR HFA) 108 (90 Base) MCG/ACT inhaler Comments:   Reason for Stopping:         naltrexone (DEPADE) 50 MG tablet Comments:   Reason for Stopping:         methylPREDNISolone (MEDROL, MARK,) 4 MG tablet Comments:   Reason for Stopping:                      (Please note that portions of this note were completed with a voice recognition program.  Efforts were made to edit the dictations but occasionally words are mis-transcribed.)    REINA Lamb CNP (electronically signed)       Arun Barr APRN - CNP  06/14/24 [id number]

## 2024-06-15 LAB
EKG ATRIAL RATE: 63 BPM
EKG DIAGNOSIS: NORMAL
EKG P AXIS: 16 DEGREES
EKG P-R INTERVAL: 146 MS
EKG Q-T INTERVAL: 384 MS
EKG QRS DURATION: 84 MS
EKG QTC CALCULATION (BAZETT): 392 MS
EKG R AXIS: 15 DEGREES
EKG T AXIS: 12 DEGREES
EKG VENTRICULAR RATE: 63 BPM

## 2024-06-15 PROCEDURE — 93010 ELECTROCARDIOGRAM REPORT: CPT | Performed by: INTERNAL MEDICINE

## 2024-06-19 ENCOUNTER — OFFICE VISIT (OUTPATIENT)
Dept: PRIMARY CARE CLINIC | Age: 39
End: 2024-06-19
Payer: MEDICAID

## 2024-06-19 VITALS
HEART RATE: 67 BPM | WEIGHT: 239.6 LBS | HEIGHT: 71 IN | DIASTOLIC BLOOD PRESSURE: 79 MMHG | BODY MASS INDEX: 33.54 KG/M2 | OXYGEN SATURATION: 97 % | SYSTOLIC BLOOD PRESSURE: 121 MMHG | TEMPERATURE: 98.3 F

## 2024-06-19 DIAGNOSIS — R11.2 NAUSEA AND VOMITING IN ADULT: ICD-10-CM

## 2024-06-19 DIAGNOSIS — R10.13 EPIGASTRIC PAIN: Primary | ICD-10-CM

## 2024-06-19 DIAGNOSIS — K21.9 GASTROESOPHAGEAL REFLUX DISEASE, UNSPECIFIED WHETHER ESOPHAGITIS PRESENT: ICD-10-CM

## 2024-06-19 DIAGNOSIS — R19.7 DIARRHEA, UNSPECIFIED TYPE: ICD-10-CM

## 2024-06-19 PROCEDURE — 99204 OFFICE O/P NEW MOD 45 MIN: CPT | Performed by: NURSE PRACTITIONER

## 2024-06-19 RX ORDER — OMEPRAZOLE 40 MG/1
40 CAPSULE, DELAYED RELEASE ORAL
Qty: 90 CAPSULE | Refills: 1 | Status: SHIPPED | OUTPATIENT
Start: 2024-06-19

## 2024-06-19 SDOH — ECONOMIC STABILITY: INCOME INSECURITY: HOW HARD IS IT FOR YOU TO PAY FOR THE VERY BASICS LIKE FOOD, HOUSING, MEDICAL CARE, AND HEATING?: HARD

## 2024-06-19 SDOH — ECONOMIC STABILITY: HOUSING INSECURITY
IN THE LAST 12 MONTHS, WAS THERE A TIME WHEN YOU DID NOT HAVE A STEADY PLACE TO SLEEP OR SLEPT IN A SHELTER (INCLUDING NOW)?: NO

## 2024-06-19 SDOH — ECONOMIC STABILITY: FOOD INSECURITY: WITHIN THE PAST 12 MONTHS, THE FOOD YOU BOUGHT JUST DIDN'T LAST AND YOU DIDN'T HAVE MONEY TO GET MORE.: OFTEN TRUE

## 2024-06-19 SDOH — ECONOMIC STABILITY: FOOD INSECURITY: WITHIN THE PAST 12 MONTHS, YOU WORRIED THAT YOUR FOOD WOULD RUN OUT BEFORE YOU GOT MONEY TO BUY MORE.: OFTEN TRUE

## 2024-06-19 ASSESSMENT — ENCOUNTER SYMPTOMS
COUGH: 0
SHORTNESS OF BREATH: 0
VOMITING: 1
ABDOMINAL DISTENTION: 0
ABDOMINAL PAIN: 1
DIARRHEA: 1
SORE THROAT: 0

## 2024-06-19 ASSESSMENT — PATIENT HEALTH QUESTIONNAIRE - PHQ9
SUM OF ALL RESPONSES TO PHQ QUESTIONS 1-9: 0
SUM OF ALL RESPONSES TO PHQ9 QUESTIONS 1 & 2: 0
SUM OF ALL RESPONSES TO PHQ QUESTIONS 1-9: 0
2. FEELING DOWN, DEPRESSED OR HOPELESS: NOT AT ALL
SUM OF ALL RESPONSES TO PHQ QUESTIONS 1-9: 0
SUM OF ALL RESPONSES TO PHQ QUESTIONS 1-9: 0
1. LITTLE INTEREST OR PLEASURE IN DOING THINGS: NOT AT ALL

## 2024-06-19 NOTE — PROGRESS NOTES
Skin:     General: Skin is warm and dry.   Neurological:      Mental Status: He is alert and oriented to person, place, and time.   Psychiatric:         Mood and Affect: Mood normal.         Behavior: Behavior normal.         Assessment:  Encounter Diagnoses   Name Primary?    Epigastric pain Yes    Nausea and vomiting in adult     Diarrhea, unspecified type     Gastroesophageal reflux disease, unspecified whether esophagitis present        Plan:  1. Epigastric pain  2. Nausea and vomiting in adult  3. Diarrhea, unspecified type  4. Gastroesophageal reflux disease, unspecified whether esophagitis present  -Patient with intermittent vomiting, loose stools, epigastric pain radiating to RLQ for the past 4 weeks. He is not in acute distress at visit but does appear uncomfortable. He does have tenderness to epigastric region on exam. At this time I will start on PPI due to history of GERD which is not controlled. At this time I am suspicious of Peptic Ulcer Disease as well.  I will also place referral to GI for further evaluation/treatment. Likely needs EGD and possibly colonoscopy. Referral info given to patient and discussed when to follow up.  - omeprazole (PRILOSEC) 40 MG delayed release capsule; Take 1 capsule by mouth every morning (before breakfast)  Dispense: 90 capsule; Refill: 1  - Marylu Hicks MD, Gastroenterology, [facility]      Return in about 3 months (around 9/19/2024) for Physical .             REINA Perez - CNP

## 2024-07-02 LAB — CRP SERPL-MCNC: <3 MG/L (ref 0–5.1)

## 2024-07-05 LAB
C DIFF TOX A+B STL QL IA: NORMAL
LACTOFERRIN STL QL IA: NORMAL

## 2024-07-06 LAB
CRYPTOSP AG STL QL IA: NORMAL
E HISTOLYT AG STL QL IA: NORMAL
G LAMBLIA AG STL QL IA: NORMAL

## 2024-07-08 LAB — CALPROTECTIN STL-MCNT: 13 UG/G
